# Patient Record
Sex: FEMALE | Race: WHITE | Employment: OTHER | URBAN - METROPOLITAN AREA
[De-identification: names, ages, dates, MRNs, and addresses within clinical notes are randomized per-mention and may not be internally consistent; named-entity substitution may affect disease eponyms.]

---

## 2017-02-15 ENCOUNTER — ANESTHESIA EVENT (OUTPATIENT)
Dept: ENDOSCOPY | Age: 77
End: 2017-02-15
Payer: MEDICARE

## 2017-02-15 ENCOUNTER — SURGERY (OUTPATIENT)
Age: 77
End: 2017-02-15

## 2017-02-15 ENCOUNTER — HOSPITAL ENCOUNTER (OUTPATIENT)
Age: 77
Setting detail: OUTPATIENT SURGERY
Discharge: HOME | End: 2017-02-15
Attending: INTERNAL MEDICINE | Admitting: INTERNAL MEDICINE
Payer: MEDICARE

## 2017-02-15 ENCOUNTER — ANESTHESIA (OUTPATIENT)
Dept: ENDOSCOPY | Age: 77
End: 2017-02-15
Payer: MEDICARE

## 2017-02-15 VITALS
HEIGHT: 62 IN | RESPIRATION RATE: 16 BRPM | DIASTOLIC BLOOD PRESSURE: 71 MMHG | SYSTOLIC BLOOD PRESSURE: 167 MMHG | WEIGHT: 202 LBS | TEMPERATURE: 97 F | OXYGEN SATURATION: 94 % | HEART RATE: 55 BPM | BODY MASS INDEX: 37.17 KG/M2

## 2017-02-15 LAB — GLUCOSE BLD STRIP.AUTO-MCNC: 94 MG/DL (ref 65–100)

## 2017-02-15 PROCEDURE — 74011250636 HC RX REV CODE- 250/636: Performed by: ANESTHESIOLOGY

## 2017-02-15 PROCEDURE — 77030011640 HC PAD GRND REM COVD -A: Performed by: INTERNAL MEDICINE

## 2017-02-15 PROCEDURE — 74011000250 HC RX REV CODE- 250

## 2017-02-15 PROCEDURE — 77030022875 HC PRB AR PLSM COAG ERBE -C: Performed by: INTERNAL MEDICINE

## 2017-02-15 PROCEDURE — 74011250636 HC RX REV CODE- 250/636

## 2017-02-15 PROCEDURE — 82962 GLUCOSE BLOOD TEST: CPT

## 2017-02-15 PROCEDURE — 76060000031 HC ANESTHESIA FIRST 0.5 HR: Performed by: INTERNAL MEDICINE

## 2017-02-15 PROCEDURE — 76040000025: Performed by: INTERNAL MEDICINE

## 2017-02-15 PROCEDURE — 74011000250 HC RX REV CODE- 250: Performed by: ANESTHESIOLOGY

## 2017-02-15 RX ORDER — FAMOTIDINE 10 MG/ML
20 INJECTION INTRAVENOUS
Status: COMPLETED | OUTPATIENT
Start: 2017-02-15 | End: 2017-02-15

## 2017-02-15 RX ORDER — SODIUM CHLORIDE 0.9 % (FLUSH) 0.9 %
5-10 SYRINGE (ML) INJECTION AS NEEDED
Status: CANCELLED | OUTPATIENT
Start: 2017-02-15

## 2017-02-15 RX ORDER — PROPOFOL 10 MG/ML
INJECTION, EMULSION INTRAVENOUS AS NEEDED
Status: DISCONTINUED | OUTPATIENT
Start: 2017-02-15 | End: 2017-02-15 | Stop reason: HOSPADM

## 2017-02-15 RX ORDER — PROPOFOL 10 MG/ML
INJECTION, EMULSION INTRAVENOUS
Status: DISCONTINUED | OUTPATIENT
Start: 2017-02-15 | End: 2017-02-15 | Stop reason: HOSPADM

## 2017-02-15 RX ORDER — SODIUM CHLORIDE, SODIUM LACTATE, POTASSIUM CHLORIDE, CALCIUM CHLORIDE 600; 310; 30; 20 MG/100ML; MG/100ML; MG/100ML; MG/100ML
100 INJECTION, SOLUTION INTRAVENOUS CONTINUOUS
Status: DISCONTINUED | OUTPATIENT
Start: 2017-02-15 | End: 2017-02-15 | Stop reason: HOSPADM

## 2017-02-15 RX ORDER — SODIUM CHLORIDE, SODIUM LACTATE, POTASSIUM CHLORIDE, CALCIUM CHLORIDE 600; 310; 30; 20 MG/100ML; MG/100ML; MG/100ML; MG/100ML
100 INJECTION, SOLUTION INTRAVENOUS CONTINUOUS
Status: CANCELLED | OUTPATIENT
Start: 2017-02-15

## 2017-02-15 RX ORDER — LIDOCAINE HYDROCHLORIDE 20 MG/ML
INJECTION, SOLUTION EPIDURAL; INFILTRATION; INTRACAUDAL; PERINEURAL AS NEEDED
Status: DISCONTINUED | OUTPATIENT
Start: 2017-02-15 | End: 2017-02-15 | Stop reason: HOSPADM

## 2017-02-15 RX ADMIN — LIDOCAINE HYDROCHLORIDE 50 MG: 20 INJECTION, SOLUTION EPIDURAL; INFILTRATION; INTRACAUDAL; PERINEURAL at 12:17

## 2017-02-15 RX ADMIN — PROPOFOL 160 MCG/KG/MIN: 10 INJECTION, EMULSION INTRAVENOUS at 12:21

## 2017-02-15 RX ADMIN — FAMOTIDINE 20 MG: 10 INJECTION, SOLUTION INTRAVENOUS at 10:54

## 2017-02-15 RX ADMIN — SODIUM CHLORIDE, SODIUM LACTATE, POTASSIUM CHLORIDE, AND CALCIUM CHLORIDE 100 ML/HR: 600; 310; 30; 20 INJECTION, SOLUTION INTRAVENOUS at 10:52

## 2017-02-15 RX ADMIN — PROPOFOL 50 MG: 10 INJECTION, EMULSION INTRAVENOUS at 12:21

## 2017-02-15 NOTE — IP AVS SNAPSHOT
Summary of Care Report The Summary of Care report has been created to help improve care coordination. Users with access to [employer] or [address] (Web-based application) may access additional patient information including the Discharge Summary. If you are not currently a [address] user and need more information, please call the number listed below in the [place] [address] section and ask to be connected with Medical Records. Facility Information Name Address Phone [address] [address] [address] [phone] Patient Information Patient Name Sex  Kati Roles ([id number]) Female 1940 Discharge Information Admitting Provider Service Area Unit Sofia Covarrubias MD / [facility] Endoscopy / [phone] Discharge Provider Discharge Date/Time Discharge Disposition Destination (none) (none) (none) (none) Patient Language Language ENGLISH [13] Problem List as of 2/15/2017  Date Reviewed: 3/3/2016 Codes Priority Class Noted - Resolved Osteoarthritis of hip ICD-10-CM: M16.9 ICD-9-CM: 715.95   2010 - Present ALONSO (total hip arthroplasty) ICD-9-CM: V43.64   2010 - Present Diabetes ([address] (Chronic) ICD-10-CM: E11.9 ICD-9-CM: 250.00   2010 - Present HTN (hypertension) (Chronic) ICD-10-CM: I10 
ICD-9-CM: 401.9   2010 - Present GERD (gastroesophageal reflux disease) (Chronic) ICD-10-CM: K21.9 ICD-9-CM: 530.81   2010 - Present You are allergic to the following Allergen Reactions Aspirin Other (comments)  
 ulcers Celebrex (Celecoxib) Other (comments)  
 ulcers Nsaids (Non-Steroidal Anti-Inflammatory Drug) Other (comments) BLEEDING Current Discharge Medication List  
  
ASK your doctor about these medications Dose & Instructions Dispensing Information Comments  
 busPIRone 10 mg tablet Commonly known as:  BUSPAR Dose:  10 mg Take 10 mg by mouth two (2) times a day. Refills:  0 HumaLOG 100 unit/mL injection Generic drug:  insulin lispro  
 by SubCUTAneous route. Takes 2 units for small meal, 4 units before large meal, BID with meals  Indications: TYPE 2 DIABETES MELLITUS Refills:  0 HYDROcodone-acetaminophen  mg tablet Commonly known as:  Julaine Kava Dose:  1 Tab Take 1 Tab by mouth every eight (8) hours as needed for Pain. Refills:  0 NexIUM 40 mg capsule Generic drug:  esomeprazole Dose:  40 mg Take 40 mg by mouth two (2) times a day. Take day of surgery per anesthesia protocol. Indications: GASTROESOPHAGEAL REFLUX, ulcers Refills:  0 NORVASC 5 mg tablet Generic drug:  amLODIPine Dose:  5 mg Take 5 mg by mouth every morning. Take day of surgery per anesthesia protocol. Indications: HYPERTENSION Refills:  0 NovoLIN N 100 unit/mL injection Generic drug:  insulin NPH Dose:  18 Units 18 Units by SubCUTAneous route Before breakfast and dinner. Takes 18 units BID Did take 18 units both AM and PM yesterday  Indications: TYPE 2 DIABETES MELLITUS Refills:  0 PROzac 20 mg capsule Generic drug:  FLUoxetine Dose:  40 mg Take 40 mg by mouth two (2) times a day. Per anesthesia instructed to take am of surgery. Indications: MAJOR DEPRESSIVE DISORDER Refills:  0  
   
 VASOTEC 20 mg tablet Generic drug:  enalapril Dose:  40 mg Take 40 mg by mouth every morning. Indications: HYPERTENSION Refills:  0 ZyrTEC 10 mg tablet Generic drug:  cetirizine Dose:  10 mg Take 10 mg by mouth daily as needed. Indications: ALLERGIC RHINITIS Refills:  0 Surgery Information ID Date/Time Status Primary Surgeon All Procedures Location  [id number] 2/15/2017 1200 Unposted Oscar De Leon MD ESOPHAGOGASTRODUODENOSCOPY (EGD)/ 39 ESOPHAGEAL DILATION 
ENDOSCOPIC ARGON PLASMA COAGULATION [facility] Follow-up Information None Discharge Instructions Gastrointestinal Esophagogastroduodenoscopy (EGD) - Upper Exam Discharge Instructions 1. Call Dr. Joseph Iglesias  for any problems or questions. 2. Contact the doctor's office for follow up appointment as directed. 3. Medication may cause drowsiness for several hours, therefore, do not drive or operate machinery for remainder of the day. 4. No alcohol today. 5. Ordinarily, you may resume regular diet and activity after exam unless otherwise specified by your physician. 6. For mild soreness in your throat you may use Cepacol throat lozenges or warm salt-water gargles as needed. Any additional instructions:  Soft diet for 24 hours Instructions given to Felicia Padgett and other family members. Instructions given by:  Elridge Ganser, RN Chart Review Routing History Recipient Method Report Sent By Roshan Glover MD, MD  
Fax: [phone] Phone: [phone] Fax Provider Comm Report Georgeanne Pae [[id number]] 2/15/2010 11:24 AM 02/04/2010 Sunil Glover MD, MD  
Fax: [phone] Phone: [phone] Fax Provider Comm Report Georgeanne Pae [[id number]] 2/18/2010  4:16 PM 02/04/2010 Sunil Glover MD, MD  
Fax: [phone] Phone: [phone] Fax Provider Comm Report Georgeanne Pae [[id number]] 3/8/2010 10:16 AM 02/04/2010 Sunil Glover MD, MD  
Fax: [phone] Phone: [phone] Fax Provider Comm Report Georgeanne Pae [[id number]] 3/8/2010 10:31 AM 02/04/2010

## 2017-02-15 NOTE — ANESTHESIA POSTPROCEDURE EVALUATION
Post-Anesthesia Evaluation and Assessment    Patient: Niharika Sandoval MRN: [id number]  SSN: [id number]    YOB: 1940  Age: 68 y.o. Sex: female       Cardiovascular Function/Vital Signs  Visit Vitals    /71    Pulse (!) 55    Temp 36.1 °C (97 °F)    Resp 12    Ht 5' 2\" (1.575 m)    Wt 91.6 kg (202 lb)    SpO2 94%    BMI 36.95 kg/m2       Patient is status post total IV anesthesia anesthesia for Procedure(s):  ESOPHAGOGASTRODUODENOSCOPY (EGD)/ 39  ESOPHAGEAL DILATION  ENDOSCOPIC ARGON PLASMA COAGULATION. Nausea/Vomiting: None    Postoperative hydration reviewed and adequate. Pain:  Pain Scale 1: Numeric (0 - 10) (02/15/17 1217)  Pain Intensity 1: 0 (02/15/17 1217)   Managed    Neurological Status: At baseline    Mental Status and Level of Consciousness: Arousable    Pulmonary Status:   O2 Device: Nasal cannula (02/15/17 1240)   Adequate oxygenation and airway patent    Complications related to anesthesia: None    Post-anesthesia assessment completed.  No concerns    Signed By: Halie Vargas MD     February 15, 2017

## 2017-02-15 NOTE — IP AVS SNAPSHOT
[address] 
[phone] Patient: Johnny Oliver MRN: [id number] TVN:[id number] You are allergic to the following Allergen Reactions Aspirin Other (comments)  
 ulcers Celebrex (Celecoxib) Other (comments)  
 ulcers Nsaids (Non-Steroidal Anti-Inflammatory Drug) Other (comments) BLEEDING Recent Documentation Height Weight BMI OB Status Smoking Status 1.575 m 91.6 kg 36.95 kg/m2 Hysterectomy Never Smoker Emergency Contacts Name Discharge Info Relation Home Work Mobile [address] CAREGIVER [3] Spouse [3] [phone] Shelton Currie  Daughter [21] [phone] About your hospitalization You were admitted on:  February 15, 2017 You last received care in the:  [facility] You were discharged on:  February 15, 2017 Unit phone number:  [phone] Why you were hospitalized Your primary diagnosis was:  Not on File Providers Seen During Your Hospitalizations Provider Role Specialty Primary office phone Molly Temple MD Attending Provider Gastroenterology [phone] Your Primary Care Physician (PCP) Primary Care Physician Office Phone Office Fax OTHER, PHYS ** None ** ** None ** Follow-up Information None Current Discharge Medication List  
  
ASK your doctor about these medications Dose & Instructions Dispensing Information Comments Morning Noon Evening Bedtime  
 busPIRone 10 mg tablet Commonly known as:  BUSPAR Your next dose is: Today, Tomorrow Other:  _________ Dose:  10 mg Take 10 mg by mouth two (2) times a day. Refills:  0 HumaLOG 100 unit/mL injection Generic drug:  insulin lispro Your next dose is: Today, Tomorrow Other:  _________ by SubCUTAneous route. Takes 2 units for small meal, 4 units before large meal, BID with meals  Indications: TYPE 2 DIABETES MELLITUS Refills:  0 HYDROcodone-acetaminophen  mg tablet Commonly known as:  Tiffany Declan Your next dose is: Today, Tomorrow Other:  _________ Dose:  1 Tab Take 1 Tab by mouth every eight (8) hours as needed for Pain. Refills:  0 NexIUM 40 mg capsule Generic drug:  esomeprazole Your next dose is: Today, Tomorrow Other:  _________ Dose:  40 mg Take 40 mg by mouth two (2) times a day. Take day of surgery per anesthesia protocol. Indications: GASTROESOPHAGEAL REFLUX, ulcers Refills:  0 NORVASC 5 mg tablet Generic drug:  amLODIPine Your next dose is: Today, Tomorrow Other:  _________ Dose:  5 mg Take 5 mg by mouth every morning. Take day of surgery per anesthesia protocol. Indications: HYPERTENSION Refills:  0 NovoLIN N 100 unit/mL injection Generic drug:  insulin NPH Your next dose is: Today, Tomorrow Other:  _________ Dose:  18 Units 18 Units by SubCUTAneous route Before breakfast and dinner. Takes 18 units BID Did take 18 units both AM and PM yesterday  Indications: TYPE 2 DIABETES MELLITUS Refills:  0 PROzac 20 mg capsule Generic drug:  FLUoxetine Your next dose is: Today, Tomorrow Other:  _________ Dose:  40 mg Take 40 mg by mouth two (2) times a day. Per anesthesia instructed to take am of surgery. Indications: MAJOR DEPRESSIVE DISORDER Refills:  0  
     
   
   
   
  
 VASOTEC 20 mg tablet Generic drug:  enalapril Your next dose is: Today, Tomorrow Other:  _________ Dose:  40 mg Take 40 mg by mouth every morning. Indications: HYPERTENSION Refills:  0 ZyrTEC 10 mg tablet Generic drug:  cetirizine Your next dose is: Today, Tomorrow Other:  _________ Dose:  10 mg Take 10 mg by mouth daily as needed. Indications: ALLERGIC RHINITIS Refills:  0 Discharge Instructions Gastrointestinal Esophagogastroduodenoscopy (EGD) - Upper Exam Discharge Instructions 1. Call Dr. El Garcia  for any problems or questions. 2. Contact the doctor's office for follow up appointment as directed. 3. Medication may cause drowsiness for several hours, therefore, do not drive or operate machinery for remainder of the day. 4. No alcohol today. 5. Ordinarily, you may resume regular diet and activity after exam unless otherwise specified by your physician. 6. For mild soreness in your throat you may use Cepacol throat lozenges or warm salt-water gargles as needed. Any additional instructions:  Soft diet for 24 hours Instructions given to Arturo Efren and other family members. Instructions given by:  Sury Mclean RN Discharge Orders None Introducing [facility]! [facility] introduces [employer] patient portal. Now you can access parts of your medical record, email your doctor's office, and request medication refills online. 1. In your internet browser, go to [email or web address] 2. Click on the First Time User? Click Here link in the Sign In box. You will see the New Member Sign Up page. 3. Enter your [employer] Access Code exactly as it appears below. You will not need to use this code after youve completed the sign-up process. If you do not sign up before the expiration date, you must request a new code. · [employer] Access Code: [email or web address] Expires: 5/8/2017  1:00 PM 
 
4. Enter the last four digits of your Social Security Number (xxxx) and Date of Birth (mm/dd/yyyy) as indicated and click Submit. You will be taken to the next sign-up page. 5. Create a [employer] ID. This will be your [employer] login ID and cannot be changed, so think of one that is secure and easy to remember. 6. Create a [employer] password. You can change your password at any time. 7. Enter your Password Reset Question and Answer. This can be used at a later time if you forget your password. 8. Enter your e-mail address. You will receive e-mail notification when new information is available in [address] 9. Click Sign Up. You can now view and download portions of your medical record. 10. Click the Download Summary menu link to download a portable copy of your medical information. If you have questions, please visit the Frequently Asked Questions section of the [employer] website. Remember, [employer] is NOT to be used for urgent needs. For medical emergencies, dial 911. Now available from your iPhone and Android! General Information Please provide this summary of care documentation to your next provider. Patient Signature:  ____________________________________________________________ Date:  ____________________________________________________________  
  
Amee Rosario Provider Signature:  ____________________________________________________________ Date:  ____________________________________________________________

## 2017-02-15 NOTE — PROCEDURES
[address] [facility]   PROCEDURE NOTE       Name:  Alfredo Hooper   MR#:  [id number]   :  1940   Account #:  [de-identified]   Date of Adm:  02/15/2017       PROCEDURE: Esophagogastroduodenoscopy. INDICATIONS: Anemia, dysphagia. POSTOPERATIVE DIAGNOSIS: Gastric arteriovenous ectasias and   empiric dilation. ANESTHESIA: MAC.    OTHER PROCEDURES: Include empiric dilation with 54-Filipino Savary   and electrocoagulation with the argon plasma coagulator. SCOPE: Olympus GIF-H190. PROCEDURE NOTE: After informed consent was obtained, the patient   was placed in the left lateral decubitus position in the   endoscopy suite. Conscious sedation was obtained utilizing   monitored anesthesia. Please see anesthesia flow sheet for   details. Once adequate analgesia was obtained, Olympus GIF-H190   video chip endoscope was passed through the oropharynx into the   esophagus, stomach and small bowel under direct visualization. The scope was advanced to the area of the periampullary duodenum   and was withdrawn with careful attention to mucosal detail. The   entire exam of the duodenum is normal. The pylorus is normal. In   the prepyloric antrum, there is the previously gastric   arteriovenous ectasias noted with no active bleeding. The body,   cardia and fundus of the stomach were grossly normal. The scope   was withdrawn into the esophagus, which was grossly normal.   Given the patient's dysphagic symptoms, a Savary guidewire was   passed into the stomach. The scope was withdrawn and a single   54-Filipino Savary bougie dilator was passed over the guidewire,   into the stomach, through the esophagus and was withdrawn. The   guidewire and dilator were withdrawn and the endoscope was   reintroduced per orally into the esophagus, demonstrates no   mucosal disruption.  Subsequently, the endoscope was maneuvered   into the gastric antrum, where the argon plasma coagulator Erbe   circumferential probe was utilized to coagulate all the areas of   gastric arteriovenous ectasias with minimal blood loss. There   was no active bleeding at the completion of the procedure. After   completion of the procedure, the stomach was completely   desufflated and the endoscope was withdrawn. The patient   tolerated the procedure well. There was no blood loss. There   were no specimens sent to Pathology. ASSESSMENT AND PLAN   1. Iron deficiency anemia. Repeat EGD with anticoagulation as   needed. 2. Dysphagia. Soft diet for the next 24 hours.         MD PHYLLIS Meyers / CARLOS   D:  02/15/2017   12:35   T:  02/15/2017   13:59   Job #:  [id number]

## 2017-02-15 NOTE — DISCHARGE INSTRUCTIONS
Gastrointestinal Esophagogastroduodenoscopy (EGD) - Upper Exam Discharge Instructions    1. Call Dr. Galindo Florez  for any problems or questions. 2. Contact the doctor's office for follow up appointment as directed. 3. Medication may cause drowsiness for several hours, therefore, do not drive or operate machinery for remainder of the day. 4. No alcohol today. 5. Ordinarily, you may resume regular diet and activity after exam unless otherwise specified by your physician. 6. For mild soreness in your throat you may use Cepacol throat lozenges or warm salt-water gargles as needed. Any additional instructions:  Soft diet for 24 hours     Instructions given to Kitty Chhaya and other family members.   Instructions given by:  Lauren Shine RN

## 2017-02-15 NOTE — ANESTHESIA PREPROCEDURE EVALUATION
Anesthetic History   No history of anesthetic complications            Review of Systems / Medical History  Patient summary reviewed, nursing notes reviewed and pertinent labs reviewed    Pulmonary  Within defined limits                 Neuro/Psych   Within defined limits           Cardiovascular    Hypertension: well controlled              Exercise tolerance: >4 METS  Comments: Denies CP, SOB or changes in functional status   GI/Hepatic/Renal     GERD: well controlled      PUD     Endo/Other    Diabetes: well controlled, type 2, using insulin    Obesity and arthritis     Other Findings   Comments: Hx/o GI bleed           Physical Exam    Airway  Mallampati: II  TM Distance: 4 - 6 cm  Neck ROM: normal range of motion   Mouth opening: Normal     Cardiovascular  Regular rate and rhythm,  S1 and S2 normal,  no murmur, click, rub, or gallop  Rhythm: regular  Rate: normal         Dental    Dentition: Full lower dentures and Full upper dentures     Pulmonary  Breath sounds clear to auscultation               Abdominal  GI exam deferred       Other Findings            Anesthetic Plan    ASA: 3  Anesthesia type: total IV anesthesia          Induction: Intravenous  Anesthetic plan and risks discussed with: Patient

## 2017-10-02 ENCOUNTER — HOSPITAL ENCOUNTER (OUTPATIENT)
Dept: LAB | Age: 77
Discharge: HOME | End: 2017-10-02
Payer: MEDICARE

## 2017-10-02 LAB
ANION GAP SERPL CALC-SCNC: 9 MMOL/L (ref 7–16)
BASOPHILS # BLD: 0 K/UL (ref 0–0.2)
BASOPHILS NFR BLD: 1 % (ref 0–2)
BUN SERPL-MCNC: 18 MG/DL (ref 8–23)
CALCIUM SERPL-MCNC: 8.2 MG/DL (ref 8.3–10.4)
CHLORIDE SERPL-SCNC: 106 MMOL/L (ref 98–107)
CO2 SERPL-SCNC: 26 MMOL/L (ref 21–32)
CREAT SERPL-MCNC: 1.62 MG/DL (ref 0.6–1)
DIFFERENTIAL METHOD BLD: ABNORMAL
EOSINOPHIL # BLD: 0.5 K/UL (ref 0–0.8)
EOSINOPHIL NFR BLD: 9 % (ref 0.5–7.8)
ERYTHROCYTE [DISTWIDTH] IN BLOOD BY AUTOMATED COUNT: 13.1 % (ref 11.9–14.6)
ERYTHROCYTE [SEDIMENTATION RATE] IN BLOOD: 32 MM/HR (ref 0–30)
GLUCOSE SERPL-MCNC: 161 MG/DL (ref 65–100)
HCT VFR BLD AUTO: 38.7 % (ref 35.8–46.3)
HGB BLD-MCNC: 13.1 G/DL (ref 11.7–15.4)
IMM GRANULOCYTES # BLD: 0 K/UL (ref 0–0.5)
IMM GRANULOCYTES NFR BLD: 0.4 % (ref 0–5)
LYMPHOCYTES # BLD: 1.1 K/UL (ref 0.5–4.6)
LYMPHOCYTES NFR BLD: 22 % (ref 13–44)
MCH RBC QN AUTO: 30.4 PG (ref 26.1–32.9)
MCHC RBC AUTO-ENTMCNC: 33.9 G/DL (ref 31.4–35)
MCV RBC AUTO: 89.8 FL (ref 79.6–97.8)
MONOCYTES # BLD: 0.6 K/UL (ref 0.1–1.3)
MONOCYTES NFR BLD: 11 % (ref 4–12)
NEUTS SEG # BLD: 3 K/UL (ref 1.7–8.2)
NEUTS SEG NFR BLD: 57 % (ref 43–78)
PLATELET # BLD AUTO: 292 K/UL (ref 150–450)
PMV BLD AUTO: 9.9 FL (ref 10.8–14.1)
POTASSIUM SERPL-SCNC: 3.7 MMOL/L (ref 3.5–5.1)
RBC # BLD AUTO: 4.31 M/UL (ref 4.05–5.25)
SODIUM SERPL-SCNC: 141 MMOL/L (ref 136–145)
WBC # BLD AUTO: 5.2 K/UL (ref 4.3–11.1)

## 2017-10-02 PROCEDURE — 85025 COMPLETE CBC W/AUTO DIFF WBC: CPT | Performed by: INTERNAL MEDICINE

## 2017-10-02 PROCEDURE — 80048 BASIC METABOLIC PNL TOTAL CA: CPT | Performed by: INTERNAL MEDICINE

## 2017-10-02 PROCEDURE — 85652 RBC SED RATE AUTOMATED: CPT | Performed by: INTERNAL MEDICINE

## 2017-10-02 PROCEDURE — 36415 COLL VENOUS BLD VENIPUNCTURE: CPT | Performed by: INTERNAL MEDICINE

## 2017-10-17 ENCOUNTER — HOSPITAL ENCOUNTER (OUTPATIENT)
Dept: CT IMAGING | Age: 77
Discharge: HOME | End: 2017-10-17
Attending: NURSE PRACTITIONER
Payer: MEDICARE

## 2017-10-17 DIAGNOSIS — R10.32 LEFT LOWER QUADRANT PAIN: ICD-10-CM

## 2017-10-17 DIAGNOSIS — K57.32 DIVERTICULITIS LARGE INTESTINE W/O PERFORATION OR ABSCESS W/O BLEEDING: ICD-10-CM

## 2017-10-17 LAB — CREAT BLD-MCNC: 1.3 MG/DL (ref 0.8–1.5)

## 2017-10-17 PROCEDURE — 74011636320 HC RX REV CODE- 636/320: Performed by: NURSE PRACTITIONER

## 2017-10-17 PROCEDURE — 82565 ASSAY OF CREATININE: CPT

## 2017-10-17 PROCEDURE — 74176 CT ABD & PELVIS W/O CONTRAST: CPT

## 2017-10-17 RX ADMIN — DIATRIZOATE MEGLUMINE AND DIATRIZOATE SODIUM 15 ML: 660; 100 LIQUID ORAL; RECTAL at 12:11

## 2017-12-14 ENCOUNTER — HOSPITAL ENCOUNTER (OUTPATIENT)
Dept: GENERAL RADIOLOGY | Age: 77
Discharge: HOME | End: 2017-12-14
Attending: INTERNAL MEDICINE
Payer: MEDICARE

## 2017-12-14 DIAGNOSIS — R52 PAIN: ICD-10-CM

## 2017-12-14 PROCEDURE — 74220 X-RAY XM ESOPHAGUS 1CNTRST: CPT

## 2017-12-14 PROCEDURE — 74011636320 HC RX REV CODE- 636/320: Performed by: INTERNAL MEDICINE

## 2017-12-14 RX ADMIN — DIATRIZOATE MEGLUMINE AND DIATRIZOATE SODIUM 60 ML: 660; 100 LIQUID ORAL; RECTAL at 17:12

## (undated) DEVICE — REM POLYHESIVE ADULT PATIENT RETURN ELECTRODE: Brand: VALLEYLAB

## (undated) DEVICE — FIAPC® PROBE W/ FILTER 2200 C OD 2.3MM/6.9FR; L 2.2M/7.2FT: Brand: ERBE

## (undated) DEVICE — CONNECTOR TBNG OD5-7MM O2 END DISP

## (undated) DEVICE — BLOCK BITE AD 60FR W/ VELC STRP ADDRESSES MOST PT AND

## (undated) DEVICE — CANNULA NSL ORAL AD FOR CAPNOFLEX CO2 O2 AIRLFE